# Patient Record
(demographics unavailable — no encounter records)

---

## 2024-10-16 NOTE — PHYSICAL EXAM
[Alert] : alert [Normocephalic] : normocephalic [Flat Open Anterior [place]] : flat open anterior fontanelle [Red Reflex] : red reflex bilateral [PERRL] : PERRL [Normally Placed Ears] : normally placed ears [Auricles Well Formed] : auricles well formed [Clear Tympanic membranes] : clear tympanic membranes [Light reflex present] : light reflex present [Bony landmarks visible] : bony landmarks visible [Nares Patent] : nares patent [Palate Intact] : palate intact [Uvula Midline] : uvula midline [Symmetric Chest Rise] : symmetric chest rise [Clear to Auscultation Bilaterally] : clear to auscultation bilaterally [Regular Rate and Rhythm] : regular rate and rhythm [S1, S2 present] : S1, S2 present [+2 Femoral Pulses] : (+) 2 femoral pulses [Soft] : soft [Bowel Sounds] : bowel sounds present [External Genitalia] : normal external genitalia [Normal Vaginal Introitus] : normal vaginal introitus [Patent] : patent [Normally Placed] : normally placed [No Abnormal Lymph Nodes Palpated] : no abnormal lymph nodes palpated [Startle Reflex] : startle reflex present [Plantar Grasp] : plantar grasp reflex present [Symmetric Josephine] : symmetric josephine [Rash or Lesions] : rash and/or lesion present [Acute Distress] : no acute distress [Discharge] : no discharge [Palpable Masses] : no palpable masses [Murmurs] : no murmurs [Tender] : nontender [Distended] : nondistended [Hepatomegaly] : no hepatomegaly [Splenomegaly] : no splenomegaly [Clitoromegaly] : no clitoromegaly [Cuevas-Ortolani] : negative Cuevas-Ortolani [Allis Sign] : negative Allis sign [Spinal Dimple] : no spinal dimple [Tuft of Hair] : no tuft of hair [de-identified] : (+) circular patches of dry skin on face with associate excoriations, on upper chest, abdomen and bilateral upper arms

## 2024-10-16 NOTE — HISTORY OF PRESENT ILLNESS
[Mother] : mother [Expressed Breast milk ___oz/feed] : [unfilled] oz of expressed breast milk per feed [Formula ___ oz/feed] : [unfilled] oz of formula per feed [Hours between feeds ___] : Child is fed every [unfilled] hours [Vitamins ___] : Patient takes [unfilled] vitamins daily [___ voids per day] : [unfilled] voids per day [Frequency of stools: ___] : Frequency of stools: [unfilled]  stools [per day] : per day. [Yellow] : yellow [In Bassinet/Crib] : sleeps in bassinet/crib [On back] : sleeps on back [Sleeps 12-16 hours per 24 hours (including naps)] : sleeps 12-16 hours per 24 hours (including naps) [Pacifier use] : Pacifier use [Tummy time] : tummy time [Screen time only for video chatting] : screen time only for video chatting [No] : No cigarette smoke exposure [Rear facing car seat in back seat] : Rear facing car seat in back seat [Carbon Monoxide Detectors] : Carbon monoxide detectors at home [Smoke Detectors] : Smoke detectors at home. [NO] : No [Co-sleeping] : no co-sleeping [Loose bedding, pillow, toys, and/or bumpers in crib] : no loose bedding, pillow, toys, and/or bumpers in crib [Exposure to electronic nicotine delivery system] : No exposure to electronic nicotine delivery system [de-identified] : DRY SKIN [de-identified] : Due for 4 month vaccines today [FreeTextEntry1] : 4mo F presenting for WCC. Mother concerned for rash on chest that been present for 1 month. Mom has tried Eucerin eczema relief cream but has provide minimal relief. There are areas of abraded skin and scratches as the baby scratches her skin. Mother believes that it is itchy.  SDOH Screening Questionnaire  SDOH (Social Determinants of Health) Questionnaire: 1. Housing: Do you worry that in the upcoming months, your family, or child, may not have a safe or stable place to live? No  2. Food security: Within the last 12 months, did the food you bought not last and you did not have money to buy more? No  3. Community: Do you need help getting public benefits like food stamps or WIC? No  4. Transportation: Does your child have chronic medical condition and therefore struggle with transportation to attend medical appointments? No  5. Healthcare Access: Do you need help getting health or dental insurance? No  Result: Negative Screen. No further intervention needed.

## 2024-10-16 NOTE — DISCUSSION/SUMMARY
[Normal Growth] : growth [Normal Development] : development  [No Elimination Concerns] : elimination [Continue Regimen] : feeding [No Skin Concerns] : skin [Normal Sleep Pattern] : sleep [None] : no medical problems [Anticipatory Guidance Given] : Anticipatory guidance addressed as per the history of present illness section [Family Functioning] : family functioning [Nutritional Adequacy and Growth] : nutritional adequacy and growth [Infant Development] : infant development [Oral Health] : oral health [Safety] : safety [Age Approp Vaccines] : Age appropriate vaccines administered [Parent/Guardian] : Parent/Guardian [] : The components of the vaccine(s) to be administered today are listed in the plan of care. The disease(s) for which the vaccine(s) are intended to prevent and the risks have been discussed with the caretaker.  The risks are also included in the appropriate vaccination information statements which have been provided to the patient's caregiver.  The caregiver has given consent to vaccinate. [FreeTextEntry1] : 4 month old F presenting for HCM. Growth and development normal. PE remarkable for moderate-severe eczema. Maternal depression screen passed. Immunizations due.  PLAN - Routine care & anticipatory guidance given - Vaccines given: Pentacel, Prevnar & Rotarix - Post vaccine care discussed & potential side effects reviewed - Tylenol every 4 hours prn for fever or pain - Continue ad yamila feeds - No head lag: counseled on intro to solids starting with infant cereal, may slowly introduce stage 1 baby foods - Choking hazards reviewed, no cows milk or honey until after age 1 year old - RTC for 6 month old HCM and prn  MODERATE- SEVERE ECZEMA Recommend daily emollient use with vaseline up to 4 times daily. Mometasone furoate 0.1% cream to be used as prescribed. Triamcinolone 0.025% ointment to be used as prescribed. Side effect of topical steroids discussed. Mupirocin 2% topical ointment to be used as prescribed. Bathe every 2-3 days with Cerave or aveeno wash and avoid hot water.  Sleep with cool mist humidifier. RTC 2-3 days for follow up. If rash worsens, mother is to seek immediate medical attention  Caretaker expressed understanding of the plan and agrees. All questions were answered.

## 2024-10-16 NOTE — DEVELOPMENTAL MILESTONES
[Normal Development] : Normal Development [Laughs aloud] : laughs aloud [Turns to voice] : turns to voice [Vocalizes with extending cooing] : vocalizes with extending cooing [Rolls over prone to supine] : rolls over prone to supine [Supports on elbows & wrists in prone] : supports on elbows and wrists in prone [Keeps hands unfisted] : keeps hands unfisted [Plays with fingers in midline] : plays with fingers in midline [Grasps objects] : grasps objects [Passed] : passed [None] : none [FreeTextEntry2] : 0

## 2024-10-27 NOTE — HISTORY OF PRESENT ILLNESS
[de-identified] : Follow-up for moderate-severe eczema [FreeTextEntry6] : History obtained from mom with the help of : [id number] 4 month old female, presenting to the clinic for follow up of eczema. Mom mentions that the rash has gotten much better after using prescribed topical Triamcinolone and Mometasone application. Now there are only "spots" at affected site and baby doesn't itch or seem irritable. Patient is feeding, voiding and stooling appropriately.

## 2024-10-27 NOTE — DISCUSSION/SUMMARY
[FreeTextEntry1] : 4 month old female, presenting for follow-up of moderate-severe eczema.  Patient's eczema has improved, now only hypopigmented patches at sites, likely due to topical corticosteroid application.  Recommend salt baths followed immediately by Aquaphor/ application Also apply Cera Ve or a good moisturizer twice daily Please return to clinic 1 week  if rash worsens or returns.

## 2024-10-27 NOTE — PHYSICAL EXAM
[NL] : normotonic [de-identified] : Hypopigmented patches seen on trunk and B/L Upper extremities, no excoriations

## 2024-10-27 NOTE — HISTORY OF PRESENT ILLNESS
[de-identified] : Follow-up for moderate-severe eczema [FreeTextEntry6] : History obtained from mom with the help of : [id number] 4 month old female, presenting to the clinic for follow up of eczema. Mom mentions that the rash has gotten much better after using prescribed topical Triamcinolone and Mometasone application. Now there are only "spots" at affected site and baby doesn't itch or seem irritable. Patient is feeding, voiding and stooling appropriately.

## 2024-10-27 NOTE — PHYSICAL EXAM
[NL] : normotonic [de-identified] : Hypopigmented patches seen on trunk and B/L Upper extremities, no excoriations

## 2024-10-27 NOTE — PHYSICAL EXAM
[NL] : normotonic [de-identified] : Hypopigmented patches seen on trunk and B/L Upper extremities, no excoriations

## 2024-10-27 NOTE — PHYSICAL EXAM
[NL] : normotonic [de-identified] : Hypopigmented patches seen on trunk and B/L Upper extremities, no excoriations

## 2024-10-27 NOTE — HISTORY OF PRESENT ILLNESS
[de-identified] : Follow-up for moderate-severe eczema [FreeTextEntry6] : History obtained from mom with the help of : [id number] 4 month old female, presenting to the clinic for follow up of eczema. Mom mentions that the rash has gotten much better after using prescribed topical Triamcinolone and Mometasone application. Now there are only "spots" at affected site and baby doesn't itch or seem irritable. Patient is feeding, voiding and stooling appropriately.

## 2024-10-27 NOTE — HISTORY OF PRESENT ILLNESS
[de-identified] : Follow-up for moderate-severe eczema [FreeTextEntry6] : History obtained from mom with the help of : [id number] 4 month old female, presenting to the clinic for follow up of eczema. Mom mentions that the rash has gotten much better after using prescribed topical Triamcinolone and Mometasone application. Now there are only "spots" at affected site and baby doesn't itch or seem irritable. Patient is feeding, voiding and stooling appropriately.

## 2024-10-27 NOTE — PHYSICAL EXAM
[NL] : normotonic [de-identified] : Hypopigmented patches seen on trunk and B/L Upper extremities, no excoriations

## 2024-10-27 NOTE — HISTORY OF PRESENT ILLNESS
[de-identified] : Follow-up for moderate-severe eczema [FreeTextEntry6] : History obtained from mom with the help of : [id number] 4 month old female, presenting to the clinic for follow up of eczema. Mom mentions that the rash has gotten much better after using prescribed topical Triamcinolone and Mometasone application. Now there are only "spots" at affected site and baby doesn't itch or seem irritable. Patient is feeding, voiding and stooling appropriately.

## 2024-12-16 NOTE — BEGINNING OF VISIT
[Mother] : mother [] :  [Time Spent: ____ minutes] : Total time spent using  services: [unfilled] minutes. The patient's primary language is not English thus required  services. [Interpreters_IDNumber] : [id number] [Interpreters_FullName] : Tristian Geiger Lot #: [id number] [TWNoteComboBox1] : [place] Lot #: [id number]

## 2024-12-16 NOTE — DEVELOPMENTAL MILESTONES
[Normal Development] : Normal Development [None] : none [Pats or smiles at reflection] : pats or smiles at reflection [Begins to turn when name called] : begins to turn when name called [Babbles] : babbles [Rolls over prone to supine] : rolls over prone to supine [Sits briefly without support] : sits briefly without support [Reaches for object and transfers] : reaches for object and transfers [Rakes small object with 4 fingers] : rakes small object with 4 fingers [[place] small object on surface] : bangs small object on surface [Passed] : passed [FreeTextEntry2] : 0

## 2024-12-16 NOTE — HISTORY OF PRESENT ILLNESS
[Mother] : mother [Breast milk] : breast milk [Formula ___ oz/feed] : [unfilled] oz of formula per feed [Hours between feeds ___] : Child is fed every [unfilled] hours [Fruits] : no fruits [Vegetables] : vegetables [Egg] : no egg [Normal] : Normal [___ voids per day] : [unfilled] voids per day [Frequency of stools: ___] : Frequency of stools: [unfilled]  stools [per day] : per day. [Green/brown] : green/brown [In Bassinet/Crib] : sleeps in bassinet/crib [On back] : sleeps on back [Co-sleeping] : no co-sleeping [Sleeps 12-16 hours per 24 hours (including naps)] : sleeps 12-16 hours per 24 hours (including naps) [Loose bedding, pillow, toys, and/or bumpers in crib] : no loose bedding, pillow, toys, and/or bumpers in crib [Tummy time] : tummy time [No] : No cigarette smoke exposure [Exposure to electronic nicotine delivery system] : No exposure to electronic nicotine delivery system [Water heater temperature set at <120 degrees F] : Water heater temperature set at <120 degrees F [Rear facing car seat in back seat] : Rear facing car seat in back seat [Carbon Monoxide Detectors] : Carbon monoxide detectors at home [Smoke Detectors] : Smoke detectors at home. [de-identified] : squash and carrot [de-identified] : sleeps in [facility] [NO] : No [FreeTextEntry1] :       SDOH (Social Determinants of Health) Questionnaire:  1. Housing: Do you worry that in the upcoming months, your family, or child, may not have a safe or stable place to live? NEG  2. Food security: Within the last 12 months, did the food you bought not last and you did not have money to buy more? NEG  3. Community: Do you need help getting public benefits like food stamps or WIC? NEG  4. Transportation: Does your child have chronic medical condition and therefore struggle with transportation to attend medical appointments? NEG  5. Healthcare Access: Do you need help getting health or dental insurance? NEG           Result: Negative Screen. No further intervention needed.

## 2024-12-16 NOTE — DISCUSSION/SUMMARY
[Normal Growth] : growth [Normal Development] : development [None] : No medical problems [No Elimination Concerns] : elimination [No Feeding Concerns] : feeding [No Skin Concerns] : skin [Normal Sleep Pattern] : sleep [Family Functioning] : family functioning [Nutrition and Feeding] : nutrition and feeding [Infant Development] : infant development [Oral Health] : oral health [Safety] : safety [No Medications] : ~He/She~ is not on any medications [Parent/Guardian] : parent/guardian [] : The components of the vaccine(s) to be administered today are listed in the plan of care. The disease(s) for which the vaccine(s) are intended to prevent and the risks have been discussed with the caretaker.  The risks are also included in the appropriate vaccination information statements which have been provided to the patient's caregiver.  The caregiver has given consent to vaccinate. [FreeTextEntry1] : 6 month old female presenting for HCM. Growth and development normal. PE remarkable/unremarkable. Maternal depression screen passed. Immunizations UTD.  - Routine care & anticipatory guidance given - Vaccines given: Pediarix, Prevnar & Rotarix - Post vaccine care discussed & potential side effects reviewed - Tylenol every 4 hours prn for fever or pain - Continue ad yamila feeds and intro to solids, may advance to stage 2 baby foods - Choking hazards reviewed, no cows milk or honey until after age 1 year old - RTC for 9 month old HCM and prn  Caretaker expressed understanding of the plan and agrees. All questions were answered.

## 2024-12-16 NOTE — PHYSICAL EXAM
[Alert] : alert [Acute Distress] : no acute distress [Normocephalic] : normocephalic [Flat Open Anterior [place]] : flat open anterior fontanelle [Red Reflex] : red reflex bilateral [PERRL] : PERRL [Normally Placed Ears] : normally placed ears [Auricles Well Formed] : auricles well formed [Clear Tympanic membranes] : clear tympanic membranes [Light reflex present] : light reflex present [Bony landmarks visible] : bony landmarks visible [Discharge] : no discharge [Nares Patent] : nares patent [Palate Intact] : palate intact [Uvula Midline] : uvula midline [Tooth Eruption] : no tooth eruption [Supple, full passive range of motion] : supple, full passive range of motion [Palpable Masses] : no palpable masses [Symmetric Chest Rise] : symmetric chest rise [Clear to Auscultation Bilaterally] : clear to auscultation bilaterally [Regular Rate and Rhythm] : regular rate and rhythm [S1, S2 present] : S1, S2 present [Murmurs] : no murmurs [+2 Femoral Pulses] : (+) 2 femoral pulses [Soft] : soft [Tender] : nontender [Distended] : nondistended [Bowel Sounds] : bowel sounds present [Hepatomegaly] : no hepatomegaly [Splenomegaly] : no splenomegaly [Normal External Genitalia] : normal external genitalia [Clitoromegaly] : no clitoromegaly [Normal Vaginal Introitus] : normal vaginal introitus [Patent] : patent [Normally Placed] : normally placed [No Abnormal Lymph Nodes Palpated] : no abnormal lymph nodes palpated [Cuevas-Ortolani] : negative Cuevas-Ortolani [Allis Sign] : negative Allis sign [Symmetric Buttocks Creases] : symmetric buttocks creases [Spinal Dimple] : no spinal dimple [Tuft of Hair] : no tuft of hair [Plantar Grasp] : plantar grasp reflex present [Cranial Nerves Grossly Intact] : cranial nerves grossly intact [Rash or Lesions] : no rash/lesions

## 2024-12-20 NOTE — HISTORY OF PRESENT ILLNESS
[FreeTextEntry1] : Dear Dr. CARLEE CIFUENTES,   I had the pleasure of seeing your patient, MILTON BENOIT, in my office today, 12/20/2024. As you know, she is a 6 month old female referred to pediatric cardiology due to murmur.   Milton was recently found to have a murmur. No cardiac symptoms; no tachypnea or sweating with feeds. Normal weight gain. No cyanosis. No fevers or URI symptoms. No family history of congenital heart disease or sudden/unexplained death. No family member with a known genetic syndrome.

## 2024-12-20 NOTE — DISCUSSION/SUMMARY
[FreeTextEntry1] : In summary, MILTON is a 6 month old female here for murmur. Her physical exam is notable for a soft systolic murmur consistent with a benign flow murmur. Her echocardiogram shows normal intracardiac anatomy with good biventricular systolic function and no effusion. Given these results and her clinical presentation, I provided reassurance and explained that MILTON has a structurally normal heart. No further cardiac work up or follow up is necessary at this time. However, I would recommend re-evaluation if there are any new or worsening symptoms in the future.   Plan: - Return as needed for any new and/or worsening symptoms. - No activity restrictions. - No SBE prophylaxis.     Please do not hesitate to contact me if you have any questions.   Will Lancaster MD, MS, FAAP, FACC Attending Physician, Pediatric Cardiology [facility] Physician [address], [address] [place] [address] Office: [phone] Fax: [phone] Email: [email or web address]     I have spent 50 minutes of time on the encounter excluding separately reported services.

## 2024-12-20 NOTE — CARDIOLOGY SUMMARY
[Today's Date] : [unfilled] [FreeTextEntry2] : Sinus rhythm. Normal QRS axis. Normal intervals. No hypertrophy, no pre-excitation, no ST segment or T wave abnormalities.

## 2024-12-20 NOTE — PHYSICAL EXAM
[TextEntry] : Gen: Well appearing, comfortable. HEENT: Normal. CV: RRR, normal S1 and S2, +grade I/VI soft systolic murmur at LSB, no diastolic component. Resp: CTAB, no wheezing or rhonchi. Abd: Soft, non-tender and non-distended. BS present, no HSM. Ext: Cap refill < 2 seconds. 2+ pulses bilaterally. Skin: Pink and warm.

## 2024-12-20 NOTE — REVIEW OF SYSTEMS
[Acting Fussy] : not acting ~L fussy [Fever] : no fever [Wgt Loss (___ Lbs)] : no recent weight loss [Pallor] : not pale [Discharge] : no discharge [Redness] : no redness [Nasal Discharge] : no nasal discharge [Nasal Stuffiness] : no nasal congestion [Stridor] : no stridor [Cyanosis] : no cyanosis [Edema] : no edema [Diaphoresis] : not diaphoretic [Tachypnea] : not tachypneic [Wheezing] : no wheezing [Cough] : no cough [Being A Poor Eater] : not a poor eater [Vomiting] : no vomiting [Diarrhea] : no diarrhea [Decrease In Appetite] : appetite not decreased [Fainting (Syncope)] : no fainting [Dec Consciousness] :  no decrease in consciousness [Seizure] : no seizures [Hypotonicity (Flaccid)] : not hypotonic [Refusal to Bear Wgt] : normal weight bearing [Puffy Hands/Feet] : no hand/feet puffiness [Rash] : no rash [Hemangioma] : no hemangioma [Jaundice] : no jaundice [Wound problems] : no wound problems [Bruising] : no tendency for easy bruising [Swollen Glands] : no lymphadenopathy [Enlarged Vichy] : the fontanelle was not enlarged [Hoarse Cry] : no hoarse cry [Failure To Thrive] : no failure to thrive [Vaginal Discharge] : no vaginal discharge [Ambiguous Genitals] : genitals not ambiguous [Dec Urine Output] : no oliguria [Nl] : no feeding issues at this time.

## 2025-03-17 NOTE — BEGINNING OF VISIT
[Mother] : mother [] :  [Language Line ] : provided by Language Line   [Time Spent: ____ minutes] : Total time spent using  services: [unfilled] minutes. The patient's primary language is not English thus required  services. [Interpreters_IDNumber] : [id number] [Interpreters_FullName] :  mina  [TWNoteComboBox1] : [place]

## 2025-03-17 NOTE — HISTORY OF PRESENT ILLNESS
[Mother] : mother [Breast milk] : breast milk [Formula ___ oz/feed] : [unfilled] oz of formula per feed [Hours between feeds ___] : Child is fed every [unfilled] hours [Fruit] : fruit [Vegetables] : vegetables [Cereal] : cereal [Meat] : meat [Dairy] : dairy [Normal] : Normal [___ voids per day] : [unfilled] voids per day [Frequency of stools: ___] : Frequency of stools: [unfilled]  stools [per day] : per day. [In Crib] : sleeps in crib [On back] : sleeps on back [Sleeps 12-16 hours per 24 hours (including naps)] : sleeps 12-16 hours per 24 hours (including naps) [Sippy Cup use] : sippy cup use [Bottle in bed] : bottle in bed [Toothpaste] : Primary Fluoride Source: Toothpaste [Screen time only for video chatting] : screen time only for video chatting [No] : Not at  exposure [Water heater temperature set at <120 degrees F] : Water heater temperature set at <120 degrees F [Carbon Monoxide Detectors] : Carbon monoxide detectors [Smoke Detectors] : Smoke detectors [Up to date] : Up to date [NO] : No [Loose bedding, pillow, toys, and/or bumpers in crib] : loose bedding, pillow, toys, and/or bumpers in crib [Peanut] : no peanut [Co-sleeping] : no co-sleeping [Wakes up at night] : does not wake up at night [Pacifier use] : not using pacifier [Brushing teeth] : not brushing teeth [Rear facing car seat in  back seat] : No rear facing car seat in  back seat [FreeTextEntry7] : had cardiology visit for systolic murmur, noted to have normal exam and benign murmur [de-identified] : rash on face after eating pineapple and rash on body that is itchy [de-identified] : [place] [de-identified] : light blanket, counseled on removing blanket in the sleep space, reviewed safe sleep practices as per AAP guidelines [de-identified] : dont have car [FreeTextEntry1] : SDOH Screening Questionnaire   SDOH (Social Determinants of Health) Questionnaire: 1. Housing: Do you worry that in the upcoming months, your family, or child, may not have a safe or stable place to live? no 2. Food security: Within the last 12 months, did the food you bought not last and you did not have money to buy more? no 3. Community: Do you need help getting public benefits like food stamps or WIC? no 4. Transportation: Does your child have chronic medical condition and therefore struggle with transportation to attend medical appointments? no 5. Healthcare Access: Do you need help getting health or dental insurance? no       Result: Negative Screen. No further intervention needed.

## 2025-03-17 NOTE — DISCUSSION/SUMMARY
[Normal Growth] : growth [Normal Development] : development [None] : No known medical problems [No Elimination Concerns] : elimination [No Feeding Concerns] : feeding [No Skin Concerns] : skin [Normal Sleep Pattern] : sleep [Family Adaptation] : family adaptation [Infant [place]] : infant independence [Feeding Routine] : feeding routine [Safety] : safety [No Medications] : ~He/She~ is not on any medications [Parent/Guardian] : parent/guardian [] : The components of the vaccine(s) to be administered today are listed in the plan of care. The disease(s) for which the vaccine(s) are intended to prevent and the risks have been discussed with the caretaker.  The risks are also included in the appropriate vaccination information statements which have been provided to the patient's caregiver.  The caregiver has given consent to vaccinate. [FreeTextEntry1] : 9 month month old F presenting for HCM. Growth and development normal.  Immunizations UTD. Head circumference will be monitored, Anna is developmentally appropriate. Will continue to monitor, we should consider that HC measured on 12/16/24 was in error. Will recheck in 1 month's time.  PLAN - Routine care & anticipatory guidance given - Continue ad yamila feeds and intro to solids, may advance to finger foods - Choking hazards reviewed, no cows milk or honey until after age 1 year old - RTC 1 month for flu shot #2 - RTC for 12 month old HCM and prn  ATOPIC DERMATITIS Recommend daily emollient use with vaseline up to 4 times daily. Hydrocortisone to be used as prescribed. Side effect of topical steroids discussed. Bathe every 2-3 days with Cerave or aveeno wash and avoid hot water.  Sleep with cool mist humidifier.  FOOD ALLERGY, LIKELY P[place]APPLE - Continue to monitor rash on face, may apply mupirocin as prescribed  Caretaker expressed understanding of the plan and agrees. All questions were answered.

## 2025-03-17 NOTE — DEVELOPMENTAL MILESTONES
[Uses basic gestures] : uses basic gestures [Says "Pipe" or "Mama"] : says "Pipe" or "Mama" nonspecifically [Sits well without support] : sits well without support [Transitions between sitting and lying] : transitions between sitting and lying [Balances on hands and knees] : balances on hands and knees [Crawls] : crawls [Picks up small objects with 3 fingers] : picks up small objects with 3 fingers and thumb [Releases objects intentionally] : releases objects intentionally [[place] objects together] : bangs objects together [Normal Development] : Normal Development [None] : none

## 2025-03-17 NOTE — PHYSICAL EXAM
[Alert] : alert [Normocephalic] : normocephalic [Flat Open Anterior [place]] : flat open anterior fontanelle [Red Reflex] : red reflex bilateral [PERRL] : PERRL [Normally Placed Ears] : normally placed ears [Auricles Well Formed] : auricles well formed [Clear Tympanic membranes] : clear tympanic membranes [Light reflex present] : light reflex present [Nares Patent] : nares patent [Palate Intact] : palate intact [Uvula Midline] : uvula midline [Supple, full passive range of motion] : supple, full passive range of motion [Clear to Auscultation Bilaterally] : clear to auscultation bilaterally [Regular Rate and Rhythm] : regular rate and rhythm [S1, S2 present] : S1, S2 present [Soft] : soft [Bowel Sounds] : bowel sounds present [Normal External Genitalia] : normal external genitalia [Normal Vaginal Introitus] : normal vaginal introitus [Symmetric abduction and rotation of hips] : symmetric abduction and rotation of hips [Straight] : straight [Cranial Nerves Grossly Intact] : cranial nerves grossly intact [Rash or Lesions] : rash and/or lesion present [Acute Distress] : no acute distress [Palpable Masses] : no palpable masses [Murmurs] : no murmurs [Tender] : nontender [Distended] : nondistended [Hepatomegaly] : no hepatomegaly [Splenomegaly] : no splenomegaly [de-identified] : Blotchy erythematous patches on b/l cheeks and chin, erythematous scaly rash on trunk

## 2025-04-21 NOTE — REASON FOR VISIT
[TextEntry] : Patient presented to the office for 2nd influenza vaccination. Patient accompanied by mother. RN administered: Fluzone 0.5ml IM to left thigh. Patient observed and tolerated injection well, discharged no signs/symptoms of reaction.